# Patient Record
Sex: FEMALE | Race: WHITE | NOT HISPANIC OR LATINO | Employment: FULL TIME | URBAN - NONMETROPOLITAN AREA
[De-identification: names, ages, dates, MRNs, and addresses within clinical notes are randomized per-mention and may not be internally consistent; named-entity substitution may affect disease eponyms.]

---

## 2023-05-18 PROBLEM — E66.813 CLASS 3 SEVERE OBESITY DUE TO EXCESS CALORIES WITH SERIOUS COMORBIDITY AND BODY MASS INDEX (BMI) OF 45.0 TO 49.9 IN ADULT: Status: ACTIVE | Noted: 2023-05-18

## 2024-02-26 PROBLEM — E66.811 CLASS 1 OBESITY DUE TO EXCESS CALORIES WITH SERIOUS COMORBIDITY AND BODY MASS INDEX (BMI) OF 34.0 TO 34.9 IN ADULT: Status: ACTIVE | Noted: 2024-02-26

## 2024-09-03 ENCOUNTER — OFFICE VISIT (OUTPATIENT)
Dept: BARIATRICS/WEIGHT MGMT | Facility: CLINIC | Age: 24
End: 2024-09-03
Payer: COMMERCIAL

## 2024-09-03 ENCOUNTER — LAB (OUTPATIENT)
Dept: LAB | Facility: HOSPITAL | Age: 24
End: 2024-09-03
Payer: COMMERCIAL

## 2024-09-03 VITALS
HEIGHT: 65 IN | SYSTOLIC BLOOD PRESSURE: 119 MMHG | WEIGHT: 191 LBS | TEMPERATURE: 99 F | OXYGEN SATURATION: 98 % | HEART RATE: 97 BPM | BODY MASS INDEX: 31.82 KG/M2 | DIASTOLIC BLOOD PRESSURE: 83 MMHG

## 2024-09-03 DIAGNOSIS — E66.09 CLASS 1 OBESITY DUE TO EXCESS CALORIES WITH SERIOUS COMORBIDITY AND BODY MASS INDEX (BMI) OF 34.0 TO 34.9 IN ADULT: ICD-10-CM

## 2024-09-03 DIAGNOSIS — E66.09 CLASS 1 OBESITY DUE TO EXCESS CALORIES WITH SERIOUS COMORBIDITY AND BODY MASS INDEX (BMI) OF 31.0 TO 31.9 IN ADULT: ICD-10-CM

## 2024-09-03 DIAGNOSIS — Z13.21 SCREENING FOR MALNUTRITION: ICD-10-CM

## 2024-09-03 DIAGNOSIS — E55.9 VITAMIN D DEFICIENCY: ICD-10-CM

## 2024-09-03 DIAGNOSIS — R53.83 OTHER FATIGUE: ICD-10-CM

## 2024-09-03 DIAGNOSIS — Z98.84 STATUS POST LAPAROSCOPIC SLEEVE GASTRECTOMY: ICD-10-CM

## 2024-09-03 DIAGNOSIS — Z98.84 S/P LAPAROSCOPIC SLEEVE GASTRECTOMY: Primary | ICD-10-CM

## 2024-09-03 LAB
25(OH)D3 SERPL-MCNC: 48.4 NG/ML (ref 30–100)
ALBUMIN SERPL-MCNC: 4.2 G/DL (ref 3.5–5.2)
ALBUMIN/GLOB SERPL: 1.7 G/DL
ALP SERPL-CCNC: 78 U/L (ref 39–117)
ALT SERPL W P-5'-P-CCNC: 10 U/L (ref 1–33)
ANION GAP SERPL CALCULATED.3IONS-SCNC: 7 MMOL/L (ref 5–15)
AST SERPL-CCNC: 11 U/L (ref 1–32)
BASOPHILS # BLD AUTO: 0.03 10*3/MM3 (ref 0–0.2)
BASOPHILS NFR BLD AUTO: 0.5 % (ref 0–1.5)
BILIRUB SERPL-MCNC: 0.3 MG/DL (ref 0–1.2)
BUN SERPL-MCNC: 13 MG/DL (ref 6–20)
BUN/CREAT SERPL: 25 (ref 7–25)
CALCIUM SPEC-SCNC: 9.1 MG/DL (ref 8.6–10.5)
CHLORIDE SERPL-SCNC: 105 MMOL/L (ref 98–107)
CO2 SERPL-SCNC: 26 MMOL/L (ref 22–29)
CREAT SERPL-MCNC: 0.52 MG/DL (ref 0.57–1)
DEPRECATED RDW RBC AUTO: 41.5 FL (ref 37–54)
EGFRCR SERPLBLD CKD-EPI 2021: 132.4 ML/MIN/1.73
EOSINOPHIL # BLD AUTO: 0.16 10*3/MM3 (ref 0–0.4)
EOSINOPHIL NFR BLD AUTO: 2.7 % (ref 0.3–6.2)
ERYTHROCYTE [DISTWIDTH] IN BLOOD BY AUTOMATED COUNT: 12.4 % (ref 12.3–15.4)
FOLATE SERPL-MCNC: 5.85 NG/ML (ref 4.78–24.2)
GLOBULIN UR ELPH-MCNC: 2.5 GM/DL
GLUCOSE SERPL-MCNC: 80 MG/DL (ref 65–99)
HCT VFR BLD AUTO: 40.2 % (ref 34–46.6)
HGB BLD-MCNC: 13 G/DL (ref 12–15.9)
IMM GRANULOCYTES # BLD AUTO: 0.02 10*3/MM3 (ref 0–0.05)
IMM GRANULOCYTES NFR BLD AUTO: 0.3 % (ref 0–0.5)
IRON 24H UR-MRATE: 95 MCG/DL (ref 37–145)
LYMPHOCYTES # BLD AUTO: 1.91 10*3/MM3 (ref 0.7–3.1)
LYMPHOCYTES NFR BLD AUTO: 31.9 % (ref 19.6–45.3)
MCH RBC QN AUTO: 29.5 PG (ref 26.6–33)
MCHC RBC AUTO-ENTMCNC: 32.3 G/DL (ref 31.5–35.7)
MCV RBC AUTO: 91.2 FL (ref 79–97)
MONOCYTES # BLD AUTO: 0.41 10*3/MM3 (ref 0.1–0.9)
MONOCYTES NFR BLD AUTO: 6.9 % (ref 5–12)
NEUTROPHILS NFR BLD AUTO: 3.45 10*3/MM3 (ref 1.7–7)
NEUTROPHILS NFR BLD AUTO: 57.7 % (ref 42.7–76)
NRBC BLD AUTO-RTO: 0 /100 WBC (ref 0–0.2)
PLATELET # BLD AUTO: 204 10*3/MM3 (ref 140–450)
PMV BLD AUTO: 11 FL (ref 6–12)
POTASSIUM SERPL-SCNC: 4.3 MMOL/L (ref 3.5–5.2)
PROT SERPL-MCNC: 6.7 G/DL (ref 6–8.5)
RBC # BLD AUTO: 4.41 10*6/MM3 (ref 3.77–5.28)
SODIUM SERPL-SCNC: 138 MMOL/L (ref 136–145)
VIT B12 BLD-MCNC: 193 PG/ML (ref 211–946)
WBC NRBC COR # BLD AUTO: 5.98 10*3/MM3 (ref 3.4–10.8)

## 2024-09-03 PROCEDURE — 36415 COLL VENOUS BLD VENIPUNCTURE: CPT

## 2024-09-03 PROCEDURE — 84207 ASSAY OF VITAMIN B-6: CPT

## 2024-09-03 PROCEDURE — 84590 ASSAY OF VITAMIN A: CPT

## 2024-09-03 PROCEDURE — 82306 VITAMIN D 25 HYDROXY: CPT

## 2024-09-03 PROCEDURE — 84425 ASSAY OF VITAMIN B-1: CPT

## 2024-09-03 PROCEDURE — 85025 COMPLETE CBC W/AUTO DIFF WBC: CPT

## 2024-09-03 PROCEDURE — 82607 VITAMIN B-12: CPT

## 2024-09-03 PROCEDURE — 80053 COMPREHEN METABOLIC PANEL: CPT

## 2024-09-03 PROCEDURE — 82746 ASSAY OF FOLIC ACID SERUM: CPT

## 2024-09-03 PROCEDURE — 84446 ASSAY OF VITAMIN E: CPT

## 2024-09-03 PROCEDURE — 84630 ASSAY OF ZINC: CPT

## 2024-09-03 PROCEDURE — 83540 ASSAY OF IRON: CPT

## 2024-09-03 PROCEDURE — 99213 OFFICE O/P EST LOW 20 MIN: CPT | Performed by: NURSE PRACTITIONER

## 2024-09-03 NOTE — PROGRESS NOTES
"Patient Care Team:  Provider, No Known as PCP - General    Chief Complaint: S/P Sleeve Gastrectomy    Subjective   History of Present Illness  The patient is a 24-year-old female who presents today for a 1-year postoperative follow-up. She underwent a sleeve gastrectomy in 08/2023.    She reports a recent weight gain, which she attributes to a change in her job that now involves prolonged sitting and less physical activity. This is in contrast to her previous job where she was more active and walked frequently.    Her current diet consists of four meals a day, with occasional snacking in between. She does not consume sodas. Although she does not engage in any formal exercise regimen, she walks up and down the road, but not as much as she would like.       Review of Systems:     Review of Systems   Constitutional: Negative.    Respiratory: Negative.     Cardiovascular: Negative.    Gastrointestinal: Negative.    Endocrine: Negative.    Musculoskeletal: Negative.    Psychiatric/Behavioral: Negative.          Objective     Vital Signs  /83 (BP Location: Right arm, Patient Position: Sitting, Cuff Size: Adult)   Pulse 97   Temp 99 °F (37.2 °C)   Ht 165.1 cm (65\")   Wt 86.6 kg (191 lb)   SpO2 98%   BMI 31.78 kg/m²          Physical Exam:  Physical Exam  Vitals reviewed.   Constitutional:       Appearance: She is obese.   Cardiovascular:      Rate and Rhythm: Normal rate and regular rhythm.   Pulmonary:      Effort: Pulmonary effort is normal.   Abdominal:      General: Bowel sounds are normal.      Palpations: Abdomen is soft.   Musculoskeletal:         General: Normal range of motion.   Skin:     General: Skin is warm and dry.   Neurological:      Mental Status: She is alert and oriented to person, place, and time.   Psychiatric:         Mood and Affect: Mood normal.         Behavior: Behavior normal.        Physical Exam  Vital Signs  BMI is 31.78.     Results Review:    Labs " reviewed    Results        Medication Reviewed:   Current Outpatient Medications   Medication Sig Dispense Refill    ergocalciferol (ERGOCALCIFEROL) 1.25 MG (08438 UT) capsule Vitamin D2 1,250 mcg (50,000 unit) capsule   Take 1 capsule every week by oral route.      Etonogestrel (Nexplanon) 68 MG implant subdermal implant To be inserted one time by prescriber. Route Subdermal.      multivitamin with minerals (MULTIVITAMIN ADULT PO) Take 1 tablet by mouth Daily. Bariatric      omeprazole (priLOSEC) 20 MG capsule Take 1 capsule by mouth Daily.       No current facility-administered medications for this visit.       Assessment & Plan    Assessment & Plan  1. Post-sleeve gastrectomy follow-up.  She has experienced a slight weight gain, which is not uncommon post-surgery. Her initial BMI was recorded at 49.62, and she has successfully reduced it to 31.78. Her peak weight was 320 pounds. The aim is to achieve and maintain a BMI below 30. She was advised to establish specific meal times and adhere to them. Recommendations included consuming a protein shake at work, sugar-free Jell-O, popsicles, and sugar-free gum or mints between meals. She was also encouraged to maintain a food journal using the [employer] dipti to monitor her fiber intake, with a goal of at least 20 grams per day. A minimum of 150 minutes of cardio exercise per week was suggested, along with weightlifting one or two days a week for maintenance. She was instructed to complete her pending labs from February 2024. A follow-up with the dietitian in 6 months was scheduled, but she was informed that the dietitian is available sooner if needed.    POD  1 year  from Sleeve Gastrectomy.  Diagnoses and all orders for this visit:    1. S/P laparoscopic sleeve gastrectomy (Primary)  Comments:  Continue post operative meal plan, measure portions.    2. Class 1 obesity due to excess calories with serious comorbidity and body mass index (BMI) of 31.0 to 31.9 in  adult  Assessment & Plan:  Patient's (Body mass index is 31.78 kg/m².) indicates that they are morbidly/severely obese (BMI > 40 or > 35 with obesity - related health condition) with health conditions that include none . Weight is improving with treatment. BMI  is above average; BMI management plan is completed. We discussed portion control and increasing exercise.              Claudia Meade and I discussed the importance of changing behavior for consistent and successful weight loss. We first reviewed again the definition of a meal which is defined as portion sizes less than a half a cup and those portions incorporating a protein. Specifically the protein should fill half of that volume. I also explained that she should attempt to consume 4 meals as defined above daily and to avoid snacking or grazing. She should also be mindful of adequate hydration by consuming at least 64 oz of water daily and incorporation of a regular exercise regimen.     I discussed the importance of taking her multivitamins as directed.  She is to return to our office 6 months or as needed.        ALEXANDRIA Rowan  09/03/24  10:04 CDT    Patient or patient representative verbalized consent for the use of [employer] during the visit with  ALEXANDRIA Rowan for chart documentation. 9/3/2024  08:33 CDT

## 2024-09-03 NOTE — ASSESSMENT & PLAN NOTE
Patient's (Body mass index is 31.78 kg/m².) indicates that they are morbidly/severely obese (BMI > 40 or > 35 with obesity - related health condition) with health conditions that include none . Weight is improving with treatment. BMI  is above average; BMI management plan is completed. We discussed portion control and increasing exercise.

## 2024-09-05 LAB — ZINC SERPL-MCNC: 68 UG/DL (ref 44–115)

## 2024-09-06 LAB
PYRIDOXAL PHOS SERPL-MCNC: 7.3 UG/L (ref 3.4–65.2)
VIT B1 BLD-SCNC: 102.5 NMOL/L (ref 66.5–200)

## 2024-09-10 LAB
A-TOCOPHEROL VIT E SERPL-MCNC: 7.7 MG/L (ref 5.9–19.4)
GAMMA TOCOPHEROL SERPL-MCNC: 1.2 MG/L (ref 0.7–4.9)
VIT A SERPL-MCNC: 41.4 UG/DL (ref 18.9–57.3)

## 2024-09-24 ENCOUNTER — TELEPHONE (OUTPATIENT)
Dept: BARIATRICS/WEIGHT MGMT | Facility: CLINIC | Age: 24
End: 2024-09-24
Payer: COMMERCIAL